# Patient Record
Sex: MALE
[De-identification: names, ages, dates, MRNs, and addresses within clinical notes are randomized per-mention and may not be internally consistent; named-entity substitution may affect disease eponyms.]

---

## 2020-12-01 ENCOUNTER — TRANSCRIPTION ENCOUNTER (OUTPATIENT)
Age: 55
End: 2020-12-01

## 2020-12-03 ENCOUNTER — TRANSCRIPTION ENCOUNTER (OUTPATIENT)
Age: 55
End: 2020-12-03

## 2021-06-28 ENCOUNTER — TRANSCRIPTION ENCOUNTER (OUTPATIENT)
Age: 56
End: 2021-06-28

## 2023-02-10 ENCOUNTER — APPOINTMENT (OUTPATIENT)
Dept: ORTHOPEDIC SURGERY | Facility: CLINIC | Age: 58
End: 2023-02-10
Payer: COMMERCIAL

## 2023-02-10 ENCOUNTER — NON-APPOINTMENT (OUTPATIENT)
Age: 58
End: 2023-02-10

## 2023-02-10 DIAGNOSIS — M51.26 OTHER INTERVERTEBRAL DISC DISPLACEMENT, LUMBAR REGION: ICD-10-CM

## 2023-02-10 DIAGNOSIS — E11.9 TYPE 2 DIABETES MELLITUS W/OUT COMPLICATIONS: ICD-10-CM

## 2023-02-10 DIAGNOSIS — M51.36 OTHER INTERVERTEBRAL DISC DEGENERATION, LUMBAR REGION: ICD-10-CM

## 2023-02-10 DIAGNOSIS — M43.06 SPONDYLOLYSIS, LUMBAR REGION: ICD-10-CM

## 2023-02-10 DIAGNOSIS — M47.816 SPONDYLOSIS W/OUT MYELOPATHY OR RADICULOPATHY, LUMBAR REGION: ICD-10-CM

## 2023-02-10 DIAGNOSIS — M43.17 SPONDYLOLISTHESIS, LUMBOSACRAL REGION: ICD-10-CM

## 2023-02-10 PROBLEM — Z00.00 ENCOUNTER FOR PREVENTIVE HEALTH EXAMINATION: Status: ACTIVE | Noted: 2023-02-10

## 2023-02-10 PROCEDURE — 99203 OFFICE O/P NEW LOW 30 MIN: CPT

## 2023-02-10 PROCEDURE — 72100 X-RAY EXAM L-S SPINE 2/3 VWS: CPT

## 2023-02-10 RX ORDER — METHYLPREDNISOLONE 4 MG/1
4 TABLET ORAL
Qty: 1 | Refills: 1 | Status: ACTIVE | COMMUNITY
Start: 2023-02-10 | End: 1900-01-01

## 2023-02-10 RX ORDER — CYCLOBENZAPRINE HYDROCHLORIDE 10 MG/1
10 TABLET, FILM COATED ORAL
Qty: 30 | Refills: 1 | Status: ACTIVE | COMMUNITY
Start: 2023-02-10 | End: 1900-01-01

## 2023-02-10 NOTE — DISCUSSION/SUMMARY
[de-identified] : "Written by Farzaneh Saxena, acting as Scribe for Paramjit Green MD."\par \par Dr. Green - \par The documentation recorded by the scribe accurately reflects the service I personally performed and the decisions made by me.

## 2023-02-10 NOTE — PHYSICAL EXAM
[NL (90)] : forward flexion 90 degrees [NL (30)] : right lateral bending 30 degrees [Extension] : extension [Bending to left] : bending to left [Normal Mood and Affect] : normal mood and affect [Able to Communicate] : able to communicate [Well Developed] : well developed [Well Nourished] : well nourished [Facet arthropathy] : Facet arthropathy [Disc space narrowing] : Disc space narrowing [Spondylolithesis] : Spondylolithesis [] : non-antalgic [FreeTextEntry9] : Hips rotate well without pain.  [FreeTextEntry1] : Severe DDD L4-5 and L5-S1. Spondylolisthesis L5-S1.  Mild DDD L2-3 and L3-4.  DFD L4-S1. [de-identified] : extension 10 degrees [de-identified] : left lateral bending 15 degrees [de-identified] : False

## 2023-02-10 NOTE — HISTORY OF PRESENT ILLNESS
[Lower back] : lower back [Gradual] : gradual [Sudden] : sudden [7] : 7 [6] : 6 [Burning] : burning [Shooting] : shooting [Stabbing] : stabbing [Intermittent] : intermittent [Rest] : rest [Exercising] : exercising [de-identified] : 2/10/23  Initial visit for this 58 year male here today for a flare-up of severe lower back pain that began four days ago.  Some left thigh numbness.  No leg pain.  Says he has about a 20 year history of lower back pain treated in the past with PT and has occasional flare-ups.  Pain is constant and there is no comfortable position.  Cannot stand up straight.  Three Aleve for pain.  High pain scale.  Does say he had an MRI some years ago and it showed herniations. \par \par PMH:  Diabetes, last a1c 7.6 and he has lost 45 lbs, dropped from an a1c of 10.  He does not check his blood sugars.  Says his doctor recently prescribed a once a week insulin, but he has not started this as of yet.  RC repair and biceps tenodesis about 4 years ago.\par \par IMPRESSION:  2017 - IAN\par Grade 1 anterolisthesis at the L5-S1 level secondary to bilateral spondylolysis.\par The L5-S1 intervertebral disc encroaches on the inferior aspects of neural foramina bilaterally, compressing the exiting L5 nerve roots.\par Posterior disc herniation at the L4-L5 level impinging on the descending L5 nerve roots, causing mild spinal canal stenosis. \par Disc desiccation at L2-L3 and L3-L4 levels.\par ___________________________________\par  [] : Post Surgical Visit: no [FreeTextEntry1] : L spine  [FreeTextEntry5] : Pt has a hx of a few herneated discs, pt has been having a resurface of the same pain for the past 4 weeks, pt has a sharp pain across his waistline whenever he sits for a long period of time or bends in the wrong way, pain also wakes him up during the night as well as numbness and tingling into the left thigh  [FreeTextEntry7] : down into the left thigh  [de-identified] : 15+ years  [de-identified] : none

## 2023-02-24 ENCOUNTER — APPOINTMENT (OUTPATIENT)
Dept: ORTHOPEDIC SURGERY | Facility: CLINIC | Age: 58
End: 2023-02-24

## 2025-08-09 ENCOUNTER — APPOINTMENT (OUTPATIENT)
Dept: GASTROENTEROLOGY | Facility: CLINIC | Age: 60
End: 2025-08-09
Payer: COMMERCIAL

## 2025-08-09 VITALS
RESPIRATION RATE: 16 BRPM | HEART RATE: 69 BPM | HEIGHT: 69 IN | SYSTOLIC BLOOD PRESSURE: 133 MMHG | WEIGHT: 199.56 LBS | TEMPERATURE: 98.3 F | OXYGEN SATURATION: 98 % | BODY MASS INDEX: 29.56 KG/M2 | DIASTOLIC BLOOD PRESSURE: 78 MMHG

## 2025-08-09 DIAGNOSIS — Z12.11 ENCOUNTER FOR SCREENING FOR MALIGNANT NEOPLASM OF COLON: ICD-10-CM

## 2025-08-09 DIAGNOSIS — K62.5 HEMORRHAGE OF ANUS AND RECTUM: ICD-10-CM

## 2025-08-09 PROCEDURE — 99204 OFFICE O/P NEW MOD 45 MIN: CPT

## 2025-08-09 RX ORDER — SODIUM SULFATE, MAGNESIUM SULFATE, AND POTASSIUM CHLORIDE 17.75; 2.7; 2.25 G/1; G/1; G/1
1479-225-188 TABLET ORAL
Qty: 24 | Refills: 0 | Status: ACTIVE | COMMUNITY
Start: 2025-08-09 | End: 1900-01-01

## 2025-09-02 ENCOUNTER — APPOINTMENT (OUTPATIENT)
Dept: GASTROENTEROLOGY | Facility: GI CENTER | Age: 60
End: 2025-09-02
Payer: COMMERCIAL

## 2025-09-02 ENCOUNTER — RESULT REVIEW (OUTPATIENT)
Age: 60
End: 2025-09-02

## 2025-09-02 PROCEDURE — 45380 COLONOSCOPY AND BIOPSY: CPT
